# Patient Record
(demographics unavailable — no encounter records)

---
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
## 2025-06-24 NOTE — CONSULT LETTER
[Dear  ___] : Dear  [unfilled], [Consult Letter:] : I had the pleasure of evaluating your patient, [unfilled]. [Please see my note below.] : Please see my note below. [Consult Closing:] : Thank you very much for allowing me to participate in the care of this patient.  If you have any questions, please do not hesitate to contact me. [Sincerely,] : Sincerely, [FreeTextEntry2] : Dr Chiquita Rodriguez [FreeTextEntry3] : Sincerely,   Chapincito Yusuf MD, FACP
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
